# Patient Record
Sex: FEMALE | Race: WHITE | NOT HISPANIC OR LATINO
[De-identification: names, ages, dates, MRNs, and addresses within clinical notes are randomized per-mention and may not be internally consistent; named-entity substitution may affect disease eponyms.]

---

## 2024-02-01 ENCOUNTER — APPOINTMENT (OUTPATIENT)
Dept: PODIATRY | Facility: CLINIC | Age: 69
End: 2024-02-01
Payer: MEDICARE

## 2024-02-01 DIAGNOSIS — R51.9 HEADACHE, UNSPECIFIED: ICD-10-CM

## 2024-02-01 DIAGNOSIS — M79.672 PAIN IN LEFT FOOT: ICD-10-CM

## 2024-02-01 DIAGNOSIS — E78.5 HYPERLIPIDEMIA, UNSPECIFIED: ICD-10-CM

## 2024-02-01 DIAGNOSIS — Z85.828 PERSONAL HISTORY OF OTHER MALIGNANT NEOPLASM OF SKIN: ICD-10-CM

## 2024-02-01 DIAGNOSIS — M10.9 GOUT, UNSPECIFIED: ICD-10-CM

## 2024-02-01 PROBLEM — Z00.00 ENCOUNTER FOR PREVENTIVE HEALTH EXAMINATION: Status: ACTIVE | Noted: 2024-02-01

## 2024-02-01 PROCEDURE — 99203 OFFICE O/P NEW LOW 30 MIN: CPT

## 2024-02-01 PROCEDURE — 73630 X-RAY EXAM OF FOOT: CPT | Mod: LT

## 2024-02-01 RX ORDER — ATORVASTATIN CALCIUM 40 MG/1
40 TABLET, FILM COATED ORAL
Refills: 0 | Status: ACTIVE | COMMUNITY

## 2024-02-01 RX ORDER — METHYLPREDNISOLONE 4 MG/1
4 TABLET ORAL
Qty: 1 | Refills: 0 | Status: ACTIVE | COMMUNITY
Start: 2024-02-01 | End: 1900-01-01

## 2024-02-12 ENCOUNTER — APPOINTMENT (OUTPATIENT)
Dept: PODIATRY | Facility: CLINIC | Age: 69
End: 2024-02-12
Payer: MEDICARE

## 2024-02-12 DIAGNOSIS — L84 CORNS AND CALLOSITIES: ICD-10-CM

## 2024-02-12 DIAGNOSIS — M20.12 HALLUX VALGUS (ACQUIRED), LEFT FOOT: ICD-10-CM

## 2024-02-12 PROBLEM — M79.672 LEFT FOOT PAIN: Status: ACTIVE | Noted: 2024-02-05

## 2024-02-12 PROCEDURE — 99213 OFFICE O/P EST LOW 20 MIN: CPT

## 2024-02-12 RX ORDER — AMMONIUM LACTATE 12 %
12 CREAM (GRAM) TOPICAL DAILY
Qty: 1 | Refills: 3 | Status: ACTIVE | COMMUNITY
Start: 2024-02-12 | End: 1900-01-01

## 2024-02-12 NOTE — HISTORY OF PRESENT ILLNESS
[FreeTextEntry1] : Patient presents today with a complaint of left 1st MPJ redness, swelling, and pain that started on Monday, worsened Tuesday and Wednesday.  Patient has rested and that has improved the pain.  Patient denies any history of gout.  Denies any acute trauma or precipitating events.  Patient does treadmill and yoga for exercise.  Patient denies any episodes of similar pain in the past.  Denies any new medical changes or medications.  Patient does not eat red meat or drink alcohol.  Patient states that when she woke up on Monday, she felt heat to the left foot, which has since resolved.

## 2024-02-12 NOTE — PHYSICAL EXAM
[2+] : left foot dorsalis pedis 2+ [Ankle Swelling (On Exam)] : not present [Varicose Veins Of Lower Extremities] : not present [FreeTextEntry3] : CFT: 3 seconds x 10.  Temperature gradient: warm to cool.   [de-identified] : Bilateral bunion deformity present, left worse than right.  Left mild joint effusion present with erythema with no associated increase in temperature.  Pain with ROM.  No crepitus or clicking.  Muscle power: 5/5, all pedal groups.  [FreeTextEntry1] :  Epicritic sensations intact, bilateral.

## 2024-02-12 NOTE — PROCEDURE
[FreeTextEntry1] : X-rays were taken of the left foot to evaluate for any underlying fractures. (3 views - weight bearing) No acute fractures/dislocations or erosions present.  Moderate to severe bunion deformity present with medial 1st metatarsal head cysts with no punched-out lesions or cortical erosions.  No soft tissue gas.  Negative [place]'s sign.

## 2024-02-12 NOTE — ASSESSMENT
[FreeTextEntry1] : Impression: Left foot pain (M79.672).  Bunion (M21.619).  Capsulitis (M77.9) vs. Gout (M10.9).  Treatment: Discussed etiology and treatment options.   Clinical exam is most consistent with gout vs. capsulitis.  Advised patient to rest for the next few days and avoid lower extremity exercises.  Ice, elevate, alternate between ice and warm compresses.  Patient was advised to obtain blood work for Uric Acid to evaluate for a possible gout.  Due to patient's NSAID allergy, she was prescribed a Medrol dose pack instead.   Discussed gout diet with patient.  Return: One week.

## 2024-02-13 PROBLEM — M20.12 HALLUX VALGUS OF LEFT FOOT: Status: ACTIVE | Noted: 2024-02-05

## 2024-02-14 NOTE — PROCEDURE
[FreeTextEntry1] : Independently reviewed patient's Uric Acid, which was normal.  Capsulitis has resolved.

## 2024-02-14 NOTE — ASSESSMENT
[FreeTextEntry1] : Impression: Left foot pain (M79.672).  Bunion, bilateral (M21.619).  Callus (L84).    Treatment: Advised patient to follow the gout diet, as a precaution.  If symptoms recur, patient is to return to the office for reevaluation.   For the hyperkeratoses, patient was advised to use a pumice stone, one to 2 times/week and moisturize consistently afterwards.  Prescribed Ammonium Lactate as a moisturizer.   Bilateral calluses were wiped with alcohol and shaved with sterile #15 blade.  Patient is to continue wearing supportive wide shoe gear.  Explained that the hyperkeratoses are caused by the bunion deformity and will likely continue to recur unless the bunion is surgically addressed.  Patient does not have significant symptoms in the bunions to consider surgical intervention at this time.  Return: As needed.

## 2024-02-14 NOTE — PHYSICAL EXAM
[2+] : left foot dorsalis pedis 2+ [Ankle Swelling (On Exam)] : not present [Varicose Veins Of Lower Extremities] : not present [FreeTextEntry3] : CFT: 3 seconds x 10.  Temperature gradient: warm to cool.   [de-identified] : Bilateral bunion deformity present, left worse than right.  No joint effusions or erythema. No pain with 1st MPJ ROM.  No crepitus or clicking.  Muscle power: 5/5, all pedal groups.  [FreeTextEntry1] :  Epicritic sensations intact, bilateral.

## 2024-02-14 NOTE — HISTORY OF PRESENT ILLNESS
[FreeTextEntry1] : Patient returns today for follow up of left 1st MPJ redness and swelling.  Patient states that after she had taken the Medrol dose pack, her symptoms resolved by day #3.  She has gone for Uric Acid blood work and is here for review as well.   Patient also complains of hyperkeratotic, painful skin on the bilateral big toes.  She has tried a pumice stone and finds that it becomes rougher after using it.  Patient exercises regularly and wears wide [employer] sneakers.

## 2025-07-15 ENCOUNTER — APPOINTMENT (OUTPATIENT)
Dept: PODIATRY | Facility: CLINIC | Age: 70
End: 2025-07-15